# Patient Record
Sex: FEMALE | Race: WHITE
[De-identification: names, ages, dates, MRNs, and addresses within clinical notes are randomized per-mention and may not be internally consistent; named-entity substitution may affect disease eponyms.]

---

## 2018-01-17 ENCOUNTER — HOSPITAL ENCOUNTER (OUTPATIENT)
Dept: HOSPITAL 45 - C.LAB1850 | Age: 78
Discharge: HOME | End: 2018-01-17
Attending: INTERNAL MEDICINE
Payer: COMMERCIAL

## 2018-01-17 DIAGNOSIS — M10.9: Primary | ICD-10-CM

## 2018-01-17 DIAGNOSIS — M79.672: ICD-10-CM

## 2018-01-17 DIAGNOSIS — E79.0: ICD-10-CM

## 2018-01-17 LAB
ALBUMIN SERPL-MCNC: 4.1 GM/DL (ref 3.4–5)
ALP SERPL-CCNC: 99 U/L (ref 45–117)
ALT SERPL-CCNC: 36 U/L (ref 12–78)
AST SERPL-CCNC: 22 U/L (ref 15–37)
BASOPHILS # BLD: 0.03 K/UL (ref 0–0.2)
BASOPHILS NFR BLD: 0.3 %
CREAT SERPL-MCNC: 0.92 MG/DL (ref 0.6–1.2)
EOS ABS #: 0.2 K/UL (ref 0–0.5)
EOSINOPHIL NFR BLD AUTO: 272 K/UL (ref 130–400)
HCT VFR BLD CALC: 39.3 % (ref 37–47)
HGB BLD-MCNC: 12.8 G/DL (ref 12–16)
IG#: 0.03 K/UL (ref 0–0.02)
IMM GRANULOCYTES NFR BLD AUTO: 25.2 %
LYMPHOCYTES # BLD: 2.97 K/UL (ref 1.2–3.4)
MCH RBC QN AUTO: 29.6 PG (ref 25–34)
MCHC RBC AUTO-ENTMCNC: 32.6 G/DL (ref 32–36)
MCV RBC AUTO: 91 FL (ref 80–100)
MONO ABS #: 0.84 K/UL (ref 0.11–0.59)
MONOCYTES NFR BLD: 7.1 %
NEUT ABS #: 7.7 K/UL (ref 1.4–6.5)
NEUTROPHILS # BLD AUTO: 1.7 %
NEUTROPHILS NFR BLD AUTO: 65.4 %
PMV BLD AUTO: 10.8 FL (ref 7.4–10.4)
PROT SERPL-MCNC: 8.3 GM/DL (ref 6.4–8.2)
RED CELL DISTRIBUTION WIDTH CV: 13.5 % (ref 11.5–14.5)
RED CELL DISTRIBUTION WIDTH SD: 45 FL (ref 36.4–46.3)
TRANSFERRIN SERPL-MCNC: 269 MG/DL (ref 200–360)
URATE SERPL-MCNC: 6.5 MG/DL (ref 2.6–7.2)
WBC # BLD AUTO: 11.77 K/UL (ref 4.8–10.8)

## 2018-01-24 ENCOUNTER — HOSPITAL ENCOUNTER (OUTPATIENT)
Dept: HOSPITAL 45 - C.MRI | Age: 78
Discharge: HOME | End: 2018-01-24
Attending: INTERNAL MEDICINE
Payer: COMMERCIAL

## 2018-01-24 DIAGNOSIS — M10.9: Primary | ICD-10-CM

## 2018-01-24 DIAGNOSIS — M79.672: ICD-10-CM

## 2018-01-24 NOTE — DIAGNOSTIC IMAGING REPORT
LEFT FOREFOOT MRI



HISTORY:      M10.9 [id number] Left foot pain



TECHNIQUE: Multiplanar multisequence MRI of the left forefoot was performed both

before and after the intravenous administration of contrast.



COMPARISON STUDY:  None.



FINDINGS: Marrow edema within the mid to distal first metatarsal and proximal

phalanx of the first toe. There is also marrow edema within the medial sesamoid

bone at the head of the first metatarsal. There is soft tissue edema surrounding

the first MTP joint. There is associated enhancement at the head of the first

metatarsal and within the surrounding soft tissue edema. Moderate cartilage

space narrowing with marginal osteophytes at the first MTP joint consistent with

degenerative change. Questionable small erosion versus degenerative change at

the head of the first metatarsal. This measures 4 mm and is best seen on coronal

image 10. There is a bony bunion at the medial head of the first metatarsal.

Mild dorsal subcutaneous edema within the forefoot. The flexor and extensor

tendons are intact. The remaining visualized osseous structures of the forefoot

are unremarkable.



IMPRESSION:  



1. Marrow and soft tissue edema surrounding the first MTP joint with associated

enhancement. There is a questionable 4 mm erosion at the head of the first

metatarsal. These findings are nonspecific but could be due to long-standing

degenerative change or possibly an inflammatory arthropathy such as gout.

2. No fracture or dislocation within the forefoot.







Electronically signed by:  Bruce Saab M.D.

1/24/2018 1:59 PM



Dictated Date/Time:  1/24/2018 1:50 PM